# Patient Record
Sex: FEMALE | Race: WHITE
[De-identification: names, ages, dates, MRNs, and addresses within clinical notes are randomized per-mention and may not be internally consistent; named-entity substitution may affect disease eponyms.]

---

## 2017-03-31 ENCOUNTER — HOSPITAL ENCOUNTER (OUTPATIENT)
Dept: HOSPITAL 35 - RAD | Age: 77
End: 2017-03-31
Attending: INTERNAL MEDICINE
Payer: COMMERCIAL

## 2017-03-31 DIAGNOSIS — Z12.31: Primary | ICD-10-CM

## 2018-05-30 ENCOUNTER — HOSPITAL ENCOUNTER (OUTPATIENT)
Dept: HOSPITAL 35 - RAD | Age: 78
End: 2018-05-30
Attending: INTERNAL MEDICINE
Payer: COMMERCIAL

## 2018-05-30 DIAGNOSIS — Z12.31: Primary | ICD-10-CM

## 2019-06-05 ENCOUNTER — HOSPITAL ENCOUNTER (OUTPATIENT)
Dept: HOSPITAL 35 - RAD | Age: 79
End: 2019-06-05
Attending: INTERNAL MEDICINE
Payer: COMMERCIAL

## 2019-06-05 DIAGNOSIS — Z12.31: Primary | ICD-10-CM

## 2019-06-10 ENCOUNTER — HOSPITAL ENCOUNTER (OUTPATIENT)
Dept: HOSPITAL 35 - RAD | Age: 79
End: 2019-06-10
Payer: COMMERCIAL

## 2019-06-10 DIAGNOSIS — R92.8: ICD-10-CM

## 2019-06-10 DIAGNOSIS — N64.89: Primary | ICD-10-CM

## 2021-07-08 ENCOUNTER — HOSPITAL ENCOUNTER (INPATIENT)
Dept: HOSPITAL 35 - ER | Age: 81
LOS: 3 days | Discharge: HOME | DRG: 351 | End: 2021-07-11
Attending: HOSPITALIST | Admitting: HOSPITALIST
Payer: COMMERCIAL

## 2021-07-08 VITALS — DIASTOLIC BLOOD PRESSURE: 90 MMHG | SYSTOLIC BLOOD PRESSURE: 129 MMHG

## 2021-07-08 VITALS — WEIGHT: 119 LBS | HEIGHT: 60 IN | BODY MASS INDEX: 23.36 KG/M2

## 2021-07-08 DIAGNOSIS — D75.89: ICD-10-CM

## 2021-07-08 DIAGNOSIS — Z79.899: ICD-10-CM

## 2021-07-08 DIAGNOSIS — Z87.891: ICD-10-CM

## 2021-07-08 DIAGNOSIS — M81.0: ICD-10-CM

## 2021-07-08 DIAGNOSIS — M06.9: ICD-10-CM

## 2021-07-08 DIAGNOSIS — K58.9: ICD-10-CM

## 2021-07-08 DIAGNOSIS — E87.6: ICD-10-CM

## 2021-07-08 DIAGNOSIS — E83.42: ICD-10-CM

## 2021-07-08 DIAGNOSIS — R73.9: ICD-10-CM

## 2021-07-08 DIAGNOSIS — M85.80: ICD-10-CM

## 2021-07-08 DIAGNOSIS — N39.0: ICD-10-CM

## 2021-07-08 DIAGNOSIS — K40.30: Primary | ICD-10-CM

## 2021-07-08 LAB
ALBUMIN SERPL-MCNC: 2.9 G/DL (ref 3.4–5)
ALT SERPL-CCNC: 17 U/L (ref 30–65)
ANION GAP SERPL CALC-SCNC: 8 MMOL/L (ref 7–16)
ANISOCYTOSIS BLD QL SMEAR: (no result)
AST SERPL-CCNC: 23 U/L (ref 15–37)
BILIRUB DIRECT SERPL-MCNC: 0.3 MG/DL
BILIRUB SERPL-MCNC: 1.1 MG/DL (ref 0.2–1)
BILIRUB UR-MCNC: NEGATIVE MG/DL
BUN SERPL-MCNC: 8 MG/DL (ref 7–18)
CALCIUM SERPL-MCNC: 8.3 MG/DL (ref 8.5–10.1)
CHLORIDE SERPL-SCNC: 101 MMOL/L (ref 98–107)
CO2 SERPL-SCNC: 27 MMOL/L (ref 21–32)
COLOR UR: YELLOW
CREAT SERPL-MCNC: 0.8 MG/DL (ref 0.6–1)
ERYTHROCYTE [DISTWIDTH] IN BLOOD BY AUTOMATED COUNT: 14.1 % (ref 10.5–14.5)
GLUCOSE SERPL-MCNC: 151 MG/DL (ref 74–106)
GRANULOCYTES NFR BLD MANUAL: 84 % (ref 36–66)
HCT VFR BLD CALC: 47.9 % (ref 37–47)
HGB BLD-MCNC: 16.3 GM/DL (ref 12–15)
KETONES UR STRIP-MCNC: (no result) MG/DL
LG PLATELETS BLD QL SMEAR: (no result)
LIPASE: 57 U/L (ref 73–393)
LYMPHOCYTES NFR BLD AUTO: 9 % (ref 24–44)
MACROCYTES: (no result)
MCH RBC QN AUTO: 38 PG (ref 26–34)
MCHC RBC AUTO-ENTMCNC: 34 G/DL (ref 28–37)
MCV RBC: 111.9 FL (ref 80–100)
MONOCYTES NFR BLD: 3 % (ref 1–8)
MUCUS: (no result) STRN/LPF
NEUTROPHILS # BLD: 9.7 THOU/UL (ref 1.4–8.2)
NEUTS BAND NFR BLD: 4 % (ref 0–8)
NUCLEATED RBCS: 1 /100WBC
PLATELET # BLD EST: NORMAL 10*3/UL
PLATELET # BLD: 203 THOU/UL (ref 150–400)
POIKILOCYTOSIS BLD QL SMEAR: (no result)
POTASSIUM SERPL-SCNC: 3.5 MMOL/L (ref 3.5–5.1)
PROT SERPL-MCNC: 6.2 G/DL (ref 6.4–8.2)
RBC # BLD AUTO: 4.28 MIL/UL (ref 4.2–5)
RBC # UR STRIP: NEGATIVE /UL
RBC #/AREA URNS HPF: (no result) /HPF
SODIUM SERPL-SCNC: 136 MMOL/L (ref 136–145)
SP GR UR STRIP: >= 1.03 (ref 1–1.03)
SQUAMOUS: (no result) /LPF (ref 0–3)
URINE CLARITY: CLEAR
URINE GLUCOSE-RANDOM*: NEGATIVE
URINE LEUKOCYTES-REFLEX: (no result)
URINE NITRITE-REFLEX: NEGATIVE
URINE PROTEIN (DIPSTICK): NEGATIVE
URINE WBC-REFLEX: (no result) /HPF (ref 0–5)
UROBILINOGEN UR STRIP-ACNC: 0.2 E.U./DL (ref 0.2–1)
WBC # BLD AUTO: 11 THOU/UL (ref 4–11)

## 2021-07-08 PROCEDURE — 50687: CPT

## 2021-07-08 PROCEDURE — 50101: CPT

## 2021-07-08 PROCEDURE — 51489: CPT

## 2021-07-08 PROCEDURE — 62900: CPT

## 2021-07-08 PROCEDURE — 52265 CYSTOSCOPY AND TREATMENT: CPT

## 2021-07-08 PROCEDURE — 53307: CPT

## 2021-07-08 PROCEDURE — 62110: CPT

## 2021-07-08 PROCEDURE — 53314: CPT

## 2021-07-08 PROCEDURE — 50854: CPT

## 2021-07-08 PROCEDURE — 56526: CPT

## 2021-07-08 PROCEDURE — 50555 KIDNEY ENDOSCOPY & BIOPSY: CPT

## 2021-07-08 PROCEDURE — 56525: CPT

## 2021-07-08 PROCEDURE — 10195: CPT

## 2021-07-08 PROCEDURE — 52266: CPT

## 2021-07-08 PROCEDURE — 50010 RENAL EXPLORATION: CPT

## 2021-07-08 PROCEDURE — 58574: CPT

## 2021-07-08 PROCEDURE — 50411: CPT

## 2021-07-08 PROCEDURE — 50455: CPT

## 2021-07-08 PROCEDURE — 70005: CPT

## 2021-07-09 VITALS — SYSTOLIC BLOOD PRESSURE: 130 MMHG | DIASTOLIC BLOOD PRESSURE: 74 MMHG

## 2021-07-09 VITALS — SYSTOLIC BLOOD PRESSURE: 133 MMHG | DIASTOLIC BLOOD PRESSURE: 78 MMHG

## 2021-07-09 VITALS — DIASTOLIC BLOOD PRESSURE: 79 MMHG | SYSTOLIC BLOOD PRESSURE: 116 MMHG

## 2021-07-09 VITALS — SYSTOLIC BLOOD PRESSURE: 136 MMHG | DIASTOLIC BLOOD PRESSURE: 77 MMHG

## 2021-07-09 PROCEDURE — 0YU54JZ SUPPLEMENT RIGHT INGUINAL REGION WITH SYNTHETIC SUBSTITUTE, PERCUTANEOUS ENDOSCOPIC APPROACH: ICD-10-PCS

## 2021-07-09 NOTE — NUR
PT ARRIVED ON THE UNIT AT O300 ON THE BED. PT WAS AXO BUT CAN BE FORGETFUL. PT
WALKED TO THE BED WITH MIN ASSISTANCE. PT COMPLAINED OF ABD PAIN WHICH WAS
MANAGED WITH MEDS. PT CAME TO THE FLOOR WITH STREET CLOTHES. PT WAS ORIENTED
TO THE ROOM AND EDUCATED ON THE USE OF CALL LIGHT FOR ASSISTANCE. PT WAS
ASSISTED TO THE BSC WITH A WALKER. PT INSISTED ON DRINKING WATER AND WAS
REMINDED ABOUT BEING NPO. IV WAS INFILTRATED AND WAS REPLACED. PT'S BUTTOCK
WAS RED ON ASSESSMENT AND ZGAURD WAS APPLIED.REPORT TO AM NURSE.

## 2021-07-09 NOTE — NUR
THIS NURSE NOTIFIED THE INPATIENT NURSE OF THE 'S REQUEST TO BE
CONTACTED ABOUT MEDICAL INFORMATION, SPECIFICALLY WHEN THE SURGEON HAS INITIAL
ASSESSMENT.

## 2021-07-09 NOTE — EKG
[address] [facility]
[place]  [address]
Phone:  [phone]                    ELECTROCARDIOGRAM REPORT      
_______________________________________________________________________________
 
Name:       CARL CLEMONS           Room #:         438-P       ADM IN  
[facility].#:      [id number]     Account #:      [id number]  
Admission:  21    Attend Phys:    Enrique Hernandez MD     
Discharge:              Date of Birth:  [birth year]  
                                                          Report #: [id number]
   [id number]
_______________________________________________________________________________
                         [facility] ED
                                       
Test Date:    2021               Test Time:    22:55:43
Pat Name:     CARL CLEMONS        Department:   
Patient ID:   [id number]            Room:         [facility]
Gender:       F                        Technician:   kia
:          1940               Requested By: Allison Sosa
Order Number: [id number] MD:   Hubert Britton
                                 Measurements
Intervals                              Axis          
Rate:         64                       P:            -16
MT:           142                      QRS:          -57
QRSD:         105                      T:            44
QT:           510                                    
QTc:          527                                    
                           Interpretive Statements
Sinus rhythm
Atrial premature complex
LAD, consider left anterior fascicular block
Abnrm T, consider ischemia, anterolateral lds
Prolonged QT interval
Compared to ECG 2006 12:36:23
Atrial premature complex(es) now present
Possible ischemia now present
Prolonged QT interval now present
T-wave abnormality no longer present
Electronically Signed On 2021 7:11:29 CDT by Hubert Britton
[email or web address]
 
 
 
 
 
 
 
 
 
 
 
 
 
 
 
  <ELECTRONICALLY SIGNED>
   By: Hubert Britton MD, FAC    
  21     0711
D: 21 2255                           _____________________________________
T: 21 2255                           Hubert Britton MD, [facility]      /EPI

## 2021-07-09 NOTE — NUR
Assesses pt. at 7:00 am.  She was anxious stating she wanting to leave the
hospital ASAP.  When was the surgeon coming in?  I reassured her he would be
in shortly and let her know what was required.   ordered laproscopic
surgery for inguinal hernia. Gave pt. tylenol for level 2 pain.  She was AXOX4
continent on room air.  An hour later she was sleeping comfortably. Her
stomach was distended. Bed in low position.  Fall precaution in effect.  She
left for surgery around 13:00.  Will continue to monitor when she returns from
surgery.

## 2021-07-09 NOTE — NUR
PT  WOULD LIKE TO BE CALLED IF NOT AT THE BEDSIDE WITH PT REGARDS TO 
MEDICAL INFORMATION. 
MARGUERITE CLEMONS-CELL:[phone]
             HOME:[phone]

## 2021-07-09 NOTE — NUR
ASSESSMENT: CM REVIEWED CHART AND MET WITH PATIENT AND HER SPOUSE AT THE
BEDSIDE. PT IS ALERT AND ORIENTED X4. PT WAS ADMITTED DUE TO ABDOMINAL PAIN
AND POSSIBLE PARTIAL SBO. SURGERY HAS BEEN CONSULTED AND AWAITING INUT AT THIS
TIME. PT IS ON IV HYDRATION. PT REPORTS THAT SHE LIVES IN A HOUSE WITH HER
. PT REPORTS HAVING A COUPLE OF STEPS TO ENTER THE HOME WITH [employer]
AND ABOUT 3 STEPS WITH [employer] TO HER BEDROOM. PT REPORTS SHE HAS A WALKER
TO USE AT TIMES AND NORMALLY USES IT IN THE BEDROOM TO GO TO THE BATHROOM. PT
REPORTS THEY HAVE A GRAB BAR AND SHOWER CHAIR AND SHE IS NORMALLY INDEPENDENT
WITH ADLS. PT HAS BEEN TO [facility] IN THE PAST. PT DENIES HAVING HH IN
THE PAST. CM DISCUSSED ROLE. PT STATING SHE SHOULD BE FINE AND DOES NOT
ANTICIPATE HAVING ANY NEEDS FROM CM. CM WILL CONTINUE TO FOLLOW.

## 2021-07-10 VITALS — DIASTOLIC BLOOD PRESSURE: 48 MMHG | SYSTOLIC BLOOD PRESSURE: 125 MMHG

## 2021-07-10 VITALS — DIASTOLIC BLOOD PRESSURE: 71 MMHG | SYSTOLIC BLOOD PRESSURE: 123 MMHG

## 2021-07-10 LAB
ANION GAP SERPL CALC-SCNC: 12 MMOL/L (ref 7–16)
BUN SERPL-MCNC: 9 MG/DL (ref 7–18)
CALCIUM SERPL-MCNC: 6.7 MG/DL (ref 8.5–10.1)
CHLORIDE SERPL-SCNC: 107 MMOL/L (ref 98–107)
CO2 SERPL-SCNC: 21 MMOL/L (ref 21–32)
CREAT SERPL-MCNC: 0.6 MG/DL (ref 0.6–1)
ERYTHROCYTE [DISTWIDTH] IN BLOOD BY AUTOMATED COUNT: 14 % (ref 10.5–14.5)
EST. AVERAGE GLUCOSE BLD GHB EST-MCNC: 105 MG/DL
FERRITIN SERPL-MCNC: 201 NG/ML (ref 8–252)
FOLATE SERPL-MCNC: 10.8 NG/ML (ref 8.6–58.9)
GLUCOSE SERPL-MCNC: 107 MG/DL (ref 74–106)
GLYCOHEMOGLOBIN (HGB A1C): 5.3 % (ref 4.8–5.6)
HCT VFR BLD CALC: 42 % (ref 37–47)
HGB BLD-MCNC: 14.1 GM/DL (ref 12–15)
IRON SERPL-MCNC: 35 UG/DL (ref 50–170)
MAGNESIUM SERPL-MCNC: 1.5 MG/DL (ref 1.8–2.4)
MCH RBC QN AUTO: 37.9 PG (ref 26–34)
MCHC RBC AUTO-ENTMCNC: 33.6 G/DL (ref 28–37)
MCV RBC: 113 FL (ref 80–100)
PLATELET # BLD: 160 THOU/UL (ref 150–400)
POTASSIUM SERPL-SCNC: 3 MMOL/L (ref 3.5–5.1)
RBC # BLD AUTO: 3.72 MIL/UL (ref 4.2–5)
SAO2 % BLD FROM PO2: 23 % (ref 20–39)
SODIUM SERPL-SCNC: 140 MMOL/L (ref 136–145)
TIBC SERPL-MCNC: 151 UG/DL (ref 250–450)
VIT B12 SERPL-MCNC: 616 PG/ML (ref 193–986)
WBC # BLD AUTO: 11.1 THOU/UL (ref 4–11)

## 2021-07-10 NOTE — NUR
UPON SHIFT ASSESSMENT, PT AOX4, RESPONDING TO ORIENTATION PROMPTS
APPROPRIATELY, NOTED TO HAVE INTERMITTENT FORGETFULNESS. PT DENIES PAIN AND
SOB WHILE ON ROOM AIR. PT HAS PRN IV FENTANYL Q3HR AND PRN PO APAP Q6HR
AVAILABLE. PT TOLERATING PO INTAKE OF FLUIDS AND CLEAR LIQUID DIET WITHOUT
ISSUE. PT WITHOUT NAUSEA OR EMESIS. PT AMBULATING WITH WALKER AND X1 ASSIST TO
BEDSIDE COMMODE, RESTING IN BED OTHERWISE. FREQUENT REPOSISTIONING
ENCOURAGED WHILE IN BED, PT NOTED TO SHIFT INDEPENDENTLY. SENSATION INTACT,
CAPILLARY REFILL LESS THAN 3SEC, PERIPHERAL PULSES PALPABLE IN ALL
EXTREMITIES. PT ENCOURAGED TO NOTIFY STAFF FOR ALL NEEDS, CALL LIGHT WITHIN
REACH, BED ALARM ON, BED LOCKED IN LOWEST POSITION, FREQUENT MONITORING WILL
CONTINUE.

## 2021-07-10 NOTE — NUR
Assessed pt at 7:00 am AxOx3.  Pt concerned where  has been.  Explained
visiting hours. Discussion on blood work and need for Mg and K replacement. 3
incisional sites on abdomen remain steri-stripped with minimal drainage noted.
Pt is able to get OOB x 1 assist and GB to restroom.  She has been continent
today. Lungs clear on room air with bowel sounds present.  No c/o pain.
Ambulated in hallway multiple times.  IV in Left FA infusing NS at 100ml/hr.
Continues to refuse SCD's.  Diet changed to regular diet. Bed in low position.
 Fall precautions in place.  Will continur to monitor this shift and give
report to night RN.

## 2021-07-11 VITALS — DIASTOLIC BLOOD PRESSURE: 69 MMHG | SYSTOLIC BLOOD PRESSURE: 120 MMHG

## 2021-07-11 VITALS — SYSTOLIC BLOOD PRESSURE: 120 MMHG | DIASTOLIC BLOOD PRESSURE: 69 MMHG

## 2021-07-11 NOTE — NUR
ASSUMED PT CARE THIS AM. PT IS VERY DEMANDING AND CONFUSED AT TIMES. PT BEEN
WANTING TO GO HOME AND INFORMED DR. HAMILTON  AT THE BEDSIDE. GIVEN ANXIETY
MEDICATION TO RELAX PT. PT HAS IV SITE ON L FA. PT IS UP WITH ASSIST X1 WITH
GAITBELT AND WALKER TO THE BATHROOM. PT HAS 3 LAP SITE FROM SURGERY ON 7/9. PT
ON THE BED, BED ON THE LOWEST POSITION, SIDE RAILS UP, CALL LIGHT WITHIN
REACH.

## 2021-07-11 NOTE — NUR
PT AMBULATING TO BATHROOM WITH ASSIST AND IS TOLERATING FAIR.  DENIES PAIN.
RESTLESS THIS SHIFT.  CALL LIGHT WITHIN REACH.  FREQUENT OBSERVATION.

## 2021-07-11 NOTE — O
[facility]
[address]
[place]   [address]                     OPERATIVE REPORT              
_______________________________________________________________________________
 
Name:       CARL CLEMONS           Room #:         438-P       ADM IN  
M.R.#:      [id number]                       Account #:      [id number]  
Admission:  07/09/21    Attend Phys:    Clifford Tripp MD 
Discharge:              Date of Birth:  [birth year]  
                                                          Report #: [id number]
                                                                    [id number] 
_______________________________________________________________________________
THIS REPORT FOR:  
 
cc:  Chichi Torres MD, Carrie W. MD Patterson,Austin BERMUDEZ MD                                     ~
 
DATE OF SERVICE: 07/09/2021
 
PREOPERATIVE DIAGNOSIS:  Incarcerated right inguinal hernia.
 
POSTOPERATIVE DIAGNOSIS:  Incarcerated right inguinal hernia.
 
OPERATION:  Laparoscopic repair of incarcerated right inguinal hernia with mesh.
 
 
SURGEON:  Austin Moy MD
 
ANESTHESIA:  General.
 
ESTIMATED BLOOD LOSS:  Minimal.
 
SPECIMENS:  None.
 
DESCRIPTION OF PROCEDURE:  After informed consent was obtained, the patient was 
brought to the operating room and placed supine.  SCDs were placed and working, 
preoperative antibiotics were administered, general anesthesia was induced.  The
Oro catheter was placed.  The abdomen was prepped and draped in the usual 
sterile fashion.  A 10 mm incision was made above the umbilicus.  Fascia was 
incised and a trocar was placed.  Pneumoperitoneum was established.  Right and 
left-sided 5 mm trocars were placed.  The patient was placed in the 
Trendelenburg position.
 
The peritoneum at the right ASIS was scored.  Peritoneum was then incised and 
reflected inferiorly.  I identified the pubic bone.  She had a direct inguinal 
hernia.  This was fully reduced.  The round ligament was identified.
 
After the hernia had been fully reduced, I inserted a large Bard 3DMax mesh.  It
was tacked to the pubic bone with 2 absorbable tacks.  I then placed the 
peritoneum over the mesh.  There was 100% coverage of the mesh with peritoneum. 
The peritoneum was then tacked together to reapproximate it.
 
The ports were then removed under direct vision.  The fascia at the umbilicus 
was closed with a figure-of-eight 0 Vicryl.  Skin was closed with 4-0 Monocryl. 
Incisions were sealed with Steri-Strips.
 
COMPLICATIONS:  None.
 
 
 
[facility]
[address] [place]   [address]                     OPERATIVE REPORT              
_______________________________________________________________________________
 
Name:       CARL CLEMONS           Room #:         438-P       [facility] [place]  
..#:      [id number]                       Account #:      [id number]  
Admission:  07/09/21    Attend Phys:    Clifford Tripp MD 
Discharge:              Date of Birth:  [birth year]  
                                                          Report #: [id number]
                                                                    [id number] 
_______________________________________________________________________________
 
 
DISPOSITION:  The patient was taken to recovery in satisfactory condition.
 
 
 
 
 
 
 
 
 
 
 
 
 
 
 
 
 
 
 
 
 
 
 
 
 
 
 
 
 
 
 
 
 
 
 
 
 
 
 
 
 
  <ELECTRONICALLY SIGNED>
   By: Austin Moy MD     
  07/11/21     1043
D: 07/09/21 1551                           _____________________________________
T: 07/09/21 1670                           Austin Moy MD       /nt